# Patient Record
Sex: FEMALE | Race: WHITE | HISPANIC OR LATINO | URBAN - METROPOLITAN AREA
[De-identification: names, ages, dates, MRNs, and addresses within clinical notes are randomized per-mention and may not be internally consistent; named-entity substitution may affect disease eponyms.]

---

## 2022-10-04 ENCOUNTER — OFFICE VISIT (OUTPATIENT)
Dept: URBAN - METROPOLITAN AREA CLINIC 78 | Facility: CLINIC | Age: 52
End: 2022-10-04
Payer: COMMERCIAL

## 2022-10-04 ENCOUNTER — DASHBOARD ENCOUNTERS (OUTPATIENT)
Age: 52
End: 2022-10-04

## 2022-10-04 VITALS
HEIGHT: 63 IN | BODY MASS INDEX: 31.96 KG/M2 | WEIGHT: 180.4 LBS | SYSTOLIC BLOOD PRESSURE: 119 MMHG | DIASTOLIC BLOOD PRESSURE: 78 MMHG | HEART RATE: 69 BPM | TEMPERATURE: 97.8 F

## 2022-10-04 DIAGNOSIS — Z72.0 TOBACCO ABUSE: ICD-10-CM

## 2022-10-04 DIAGNOSIS — Z12.11 COLON CANCER SCREENING: ICD-10-CM

## 2022-10-04 DIAGNOSIS — K83.1 AMPULLA OF VATER OBSTRUCTION SYNDROME: ICD-10-CM

## 2022-10-04 DIAGNOSIS — K59.01 CONSTIPATION: ICD-10-CM

## 2022-10-04 PROBLEM — 14760008 CONSTIPATION: Status: ACTIVE | Noted: 2022-10-04

## 2022-10-04 PROCEDURE — 99244 OFF/OP CNSLTJ NEW/EST MOD 40: CPT | Performed by: INTERNAL MEDICINE

## 2022-10-04 PROCEDURE — 99204 OFFICE O/P NEW MOD 45 MIN: CPT | Performed by: INTERNAL MEDICINE

## 2022-10-04 RX ORDER — ATORVASTATIN CALCIUM 10 MG/1
1 TABLET TABLET, FILM COATED ORAL ONCE A DAY
Status: ACTIVE | COMMUNITY

## 2022-10-04 RX ORDER — SODIUM, POTASSIUM,MAG SULFATES 17.5-3.13G
354 ML SOLUTION, RECONSTITUTED, ORAL ORAL
Qty: 1 | Refills: 0 | OUTPATIENT
Start: 2022-10-04 | End: 2022-10-05

## 2022-10-04 NOTE — HPI-TODAY'S VISIT:
The patient was referred to us by Dr. Darby Quinteros at [facility] for a screening colonoscopy. A copy of this note will be sent to the referring physician.   The patient last had a colonoscopy in her 20's. She was told she had IBS. There is no FH of colon cancer or colon polyps.   There is no recent history of rectal bleeding. The patient has no pertinent additional complaints of abdominal pain,, diarrhea, bloating, anorexia or unintentional weight loss.  She has had mild chronic  constipation. She takes Miralax daily with great results.  Regarding any upper GI complaints, the patient has not had heartburn, nausea, vomiting or dysphagia. She is not on Omeprazole. She has a remote history of H plyori infection. She was treated for such in [facility].  She believes eradication was confirmed but is not sure about this.  She states that in her younger years she had an episode of transaminitis. 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    The patient does not take blood thinners.  They deny any CP or CARDONA.  She is originally from [facility].  She is a long-time smoker.

## 2022-10-28 ENCOUNTER — TELEPHONE ENCOUNTER (OUTPATIENT)
Dept: URBAN - METROPOLITAN AREA CLINIC 78 | Facility: CLINIC | Age: 52
End: 2022-10-28

## 2022-11-04 ENCOUNTER — OFFICE VISIT (OUTPATIENT)
Dept: URBAN - METROPOLITAN AREA SURGERY CENTER 15 | Facility: SURGERY CENTER | Age: 52
End: 2022-11-04
Payer: COMMERCIAL

## 2022-11-04 DIAGNOSIS — K63.89 BACTERIAL OVERGROWTH SYNDROME: ICD-10-CM

## 2022-11-04 DIAGNOSIS — Z12.11 COLON CANCER SCREENING: ICD-10-CM

## 2022-11-04 DIAGNOSIS — K62.1 ANAL AND RECTAL POLYP: ICD-10-CM

## 2022-11-04 PROCEDURE — G8907 PT DOC NO EVENTS ON DISCHARG: HCPCS | Performed by: INTERNAL MEDICINE

## 2022-11-04 PROCEDURE — 45380 COLONOSCOPY AND BIOPSY: CPT | Performed by: INTERNAL MEDICINE

## 2022-11-04 PROCEDURE — 45385 COLONOSCOPY W/LESION REMOVAL: CPT | Performed by: INTERNAL MEDICINE

## 2022-11-04 RX ORDER — ATORVASTATIN CALCIUM 10 MG/1
1 TABLET TABLET, FILM COATED ORAL ONCE A DAY
Status: ACTIVE | COMMUNITY